# Patient Record
(demographics unavailable — no encounter records)

---

## 2025-03-17 NOTE — DISCUSSION/SUMMARY
[de-identified] : - reviewed the nature of this injury and prognosis with the patient and his wife in layman's terms - discussed indications for both operative and nonoperative treatment - reviewed conservative treatment options including the role for immobilization, anti-inflammatory medications and therapy - reviewed operative treatments including open reduction and internal fixation and the postoperative expectations - reviewed risks, benefits and alternatives to these - with all of this in mind the patient would like to proceed with closed treatment  - well-fitted Exos brace applied today  - brace care reviewed - activity modifications reviewed, no heavy lifting, pushing or pulling for now - NSAIDs as needed for pain - f/u in 2 weeks with new x-rays out of brace at that time  My cumulative time spent on this patient's visit included: Preparation for the visit, review of the medical records, review of pertinent diagnostic studies, examination and counseling of the patient on the above diagnosis, treatment plan and prognosis, orders of diagnostic tests, medications and/or appropriate procedures and documentation in the medical records of today's visit.  30 minutes were spent on this encounter

## 2025-03-17 NOTE — HISTORY OF PRESENT ILLNESS
[de-identified] : WC right wrist DOI 3/4/25  03/17/2025 MEGHA HOOD is a 56 year old male here today for: Location: right wrist  Complaint: The patient presents to the office today for evaluation of right wrist pain after a work-related injury.  He notes that he fell from a ladder while at work on 3/4.  He noted immediate pain and swelling and was later seen in the ER where he was placed in a splint with instructions to follow-up as an outpatient.  He later saw another hand surgeon who recommended surgical management.  He is here today for a second opinion.  Symptom onset: 3/4/25 Prior treatments: splint Hand Dominance:  left  Occupation: superintendent  PMH: HTN Allergies: NKDA

## 2025-03-17 NOTE — IMAGING
[de-identified] : Right wrist Mildly swollen with evolving ecchymosis No open wounds TTP at the distal radius Wrist motion not assessed due to fracture Able to make a loose composite fist +AIN/ PIN/ Ulnar n SILT throughout fingers wwp  3 views right wrist are available for review: Comminuted, intra-articular distal radius fracture with dorsal angulation

## 2025-03-31 NOTE — DISCUSSION/SUMMARY
[de-identified] : - reviewed the nature of this injury and prognosis with the patient and his wife in layman's terms - discussed indications for both operative and nonoperative treatment - reviewed conservative treatment options including the role for immobilization, anti-inflammatory medications and therapy - reviewed operative treatments including open reduction and internal fixation and the postoperative expectations - reviewed risks, benefits and alternatives to these - with all of this in mind the patient would like to proceed with closed treatment  - well-fitted Exos brace applied today  - brace care reviewed - activity modifications reviewed, no heavy lifting, pushing or pulling for now - NSAIDs as needed for pain - f/u in 2 weeks with new x-rays out of brace at that time  My cumulative time spent on this patient's visit included: Preparation for the visit, review of the medical records, review of pertinent diagnostic studies, examination and counseling of the patient on the above diagnosis, treatment plan and prognosis, orders of diagnostic tests, medications and/or appropriate procedures and documentation in the medical records of today's visit.  30 minutes were spent on this encounter [employer]

## 2025-03-31 NOTE — DISCUSSION/SUMMARY
[de-identified] : - reviewed the nature of this injury and prognosis with the patient and his wife in layman's terms - discussed indications for both operative and nonoperative treatment - reviewed conservative treatment options including the role for immobilization, anti-inflammatory medications and therapy - reviewed operative treatments including open reduction and internal fixation and the postoperative expectations - reviewed risks, benefits and alternatives to these - with all of this in mind the patient would like to proceed with closed treatment  - well-fitted Exos brace applied today  - brace care reviewed - activity modifications reviewed, no heavy lifting, pushing or pulling for now - NSAIDs as needed for pain - f/u in 2 weeks with new x-rays out of brace at that time  My cumulative time spent on this patient's visit included: Preparation for the visit, review of the medical records, review of pertinent diagnostic studies, examination and counseling of the patient on the above diagnosis, treatment plan and prognosis, orders of diagnostic tests, medications and/or appropriate procedures and documentation in the medical records of today's visit.  30 minutes were spent on this encounter

## 2025-03-31 NOTE — IMAGING
[de-identified] : Right wrist Mildly swollen with evolving ecchymosis No open wounds TTP at the distal radius Wrist motion not assessed due to fracture Able to make a loose composite fist +AIN/ PIN/ Ulnar n SILT throughout fingers wwp  3 views right wrist are available for review: Comminuted, intra-articular distal radius fracture with dorsal angulation

## 2025-03-31 NOTE — IMAGING
[de-identified] : Right wrist Mildly swollen with evolving ecchymosis No open wounds TTP at the distal radius Wrist motion not assessed due to fracture Able to make a loose composite fist +AIN/ PIN/ Ulnar n SILT throughout fingers wwp  3 views right wrist are available for review: Comminuted, intra-articular distal radius fracture with dorsal angulation

## 2025-03-31 NOTE — HISTORY OF PRESENT ILLNESS
[de-identified] : WC right wrist DOI 3/4/25  3/31/25: The patient returns today for repeat evaluation of his right wrist, reports he is doing okay, has been having soreness within the area.  03/17/2025 MEGHA HOOD is a 56 year old male here today for: Location: right wrist  Complaint: The patient presents to the office today for evaluation of right wrist pain after a work-related injury.  He notes that he fell from a ladder while at work on 3/4.  He noted immediate pain and swelling and was later seen in the ER where he was placed in a splint with instructions to follow-up as an outpatient.  He later saw another hand surgeon who recommended surgical management.  He is here today for a second opinion.  Symptom onset: 3/4/25 Prior treatments: splint Hand Dominance:  left  Occupation: superintendent  PMH: HTN Allergies: NKDA

## 2025-04-14 NOTE — WORK
[Fracture] : fracture [Was the competent medical cause of the injury] : was the competent medical cause of the injury [Are consistent with the injury] : are consistent with the injury [Consistent with my objective findings] : consistent with my objective findings [Does not reveal pre-existing condition(s) that may affect treatment/prognosis] : does not reveal pre-existing condition(s) that may affect treatment/prognosis [Patient] : patient [No Rx restrictions] : No Rx restrictions. [I provided the services listed above] :  I provided the services listed above.

## 2025-04-24 NOTE — HISTORY OF PRESENT ILLNESS
[de-identified] : WC right wrist DOI 3/4/25  4/14/25: The patient returns today for repeat evaluation of his right distal radius fracture treated nonoperatively.  He has been in a brace and remains somewhat stiff and painful but has been working on finger range of motion. No new injury, numbness and tingling.   03/17/2025 MEGHA HOOD is a 56 year old male here today for: Location: right wrist  Complaint: The patient presents to the office today for evaluation of right wrist pain after a work-related injury.  He notes that he fell from a ladder while at work on 3/4.  He noted immediate pain and swelling and was later seen in the ER where he was placed in a splint with instructions to follow-up as an outpatient.  He later saw another hand surgeon who recommended surgical management.  He is here today for a second opinion.  Symptom onset: 3/4/25 Prior treatments: splint Hand Dominance:  left  Occupation: superintendent  PMH: HTN Allergies: NKDA

## 2025-04-24 NOTE — IMAGING
[de-identified] : Right wrist No swelling, ecchymosis or wounds NTTP at the distal radius Stiff wrist ROM Able to make a loose composite fist +AIN/ PIN/ Ulnar n SILT throughout fingers wwp  3 views right wrist: Subacute, comminuted, intra-articular distal radius fracture with dorsal angulation, acceptable position, no interval displacement from prior

## 2025-04-24 NOTE — HISTORY OF PRESENT ILLNESS
[de-identified] : WC right wrist DOI 3/4/25  4/14/25: The patient returns today for repeat evaluation of his right distal radius fracture treated nonoperatively.  He has been in a brace and remains somewhat stiff and painful but has been working on finger range of motion. No new injury, numbness and tingling.   03/17/2025 MEGHA HOOD is a 56 year old male here today for: Location: right wrist  Complaint: The patient presents to the office today for evaluation of right wrist pain after a work-related injury.  He notes that he fell from a ladder while at work on 3/4.  He noted immediate pain and swelling and was later seen in the ER where he was placed in a splint with instructions to follow-up as an outpatient.  He later saw another hand surgeon who recommended surgical management.  He is here today for a second opinion.  Symptom onset: 3/4/25 Prior treatments: splint Hand Dominance:  left  Occupation: superintendent  PMH: HTN Allergies: NKDA

## 2025-04-24 NOTE — DISCUSSION/SUMMARY
[de-identified] : x-rays reviewed, interval healing Wean from brace  Wrist and finger range of motion exercises reviewed again OT Rx provided today NSAIDs as needed for pain Follow-up in 4 weeks with new x-rays

## 2025-04-24 NOTE — IMAGING
[de-identified] : Right wrist No swelling, ecchymosis or wounds NTTP at the distal radius Stiff wrist ROM Able to make a loose composite fist +AIN/ PIN/ Ulnar n SILT throughout fingers wwp  3 views right wrist: Subacute, comminuted, intra-articular distal radius fracture with dorsal angulation, acceptable position, no interval displacement from prior

## 2025-04-24 NOTE — DISCUSSION/SUMMARY
[de-identified] : x-rays reviewed, interval healing Wean from brace  Wrist and finger range of motion exercises reviewed again OT Rx provided today NSAIDs as needed for pain Follow-up in 4 weeks with new x-rays

## 2025-05-14 NOTE — DISCUSSION/SUMMARY
[de-identified] : x-rays reviewed, interval healing Continue range of motion exercises Continue OT  Okay to begin advancing activities as tolerated NSAIDs as needed for pain Follow-up in 4 weeks with new x-rays

## 2025-05-14 NOTE — HISTORY OF PRESENT ILLNESS
[de-identified] : 5/12/25: The patient returns today for repeat evaluation of his right distal radius fracture treated nonoperatively.  He has returned to many of his previous activities since we saw him last with minimal pain despite some mild stiffness.  He is working with physical therapy and is seeing slow improvements in his range of motion.  No new injuries or complaints today.  03/17/2025 MEGHA HOOD is a 56 year old male here today for: Location: right wrist  Complaint: The patient presents to the office today for evaluation of right wrist pain after a work-related injury.  He notes that he fell from a ladder while at work on 3/4.  He noted immediate pain and swelling and was later seen in the ER where he was placed in a splint with instructions to follow-up as an outpatient.  He later saw another hand surgeon who recommended surgical management.  He is here today for a second opinion.  Symptom onset: 3/4/25 Prior treatments: splint Hand Dominance:  left  Occupation: superintendent  PMH: HTN Allergies: NKDA

## 2025-05-14 NOTE — HISTORY OF PRESENT ILLNESS
[de-identified] : 5/12/25: The patient returns today for repeat evaluation of his right distal radius fracture treated nonoperatively.  He has returned to many of his previous activities since we saw him last with minimal pain despite some mild stiffness.  He is working with physical therapy and is seeing slow improvements in his range of motion.  No new injuries or complaints today.  03/17/2025 MEGHA HOOD is a 56 year old male here today for: Location: right wrist  Complaint: The patient presents to the office today for evaluation of right wrist pain after a work-related injury.  He notes that he fell from a ladder while at work on 3/4.  He noted immediate pain and swelling and was later seen in the ER where he was placed in a splint with instructions to follow-up as an outpatient.  He later saw another hand surgeon who recommended surgical management.  He is here today for a second opinion.  Symptom onset: 3/4/25 Prior treatments: splint Hand Dominance:  left  Occupation: superintendent  PMH: HTN Allergies: NKDA

## 2025-05-14 NOTE — IMAGING
[de-identified] : Right wrist No swelling, ecchymosis or wounds NTTP at the distal radius 45 deg extension, 30 deg flexion near full pronosupination Able to make a full composite fist +AIN/ PIN/ Ulnar n SILT throughout fingers wwp  3 views right wrist: Subacute, comminuted, intra-articular distal radius fracture with dorsal angulation, acceptable position, no interval displacement from prior

## 2025-05-14 NOTE — DISCUSSION/SUMMARY
[de-identified] : x-rays reviewed, interval healing Continue range of motion exercises Continue OT  Okay to begin advancing activities as tolerated NSAIDs as needed for pain Follow-up in 4 weeks with new x-rays

## 2025-05-14 NOTE — IMAGING
[de-identified] : Right wrist No swelling, ecchymosis or wounds NTTP at the distal radius 45 deg extension, 30 deg flexion near full pronosupination Able to make a full composite fist +AIN/ PIN/ Ulnar n SILT throughout fingers wwp  3 views right wrist: Subacute, comminuted, intra-articular distal radius fracture with dorsal angulation, acceptable position, no interval displacement from prior

## 2025-07-14 NOTE — DISCUSSION/SUMMARY
[de-identified] : x-rays reviewed, healed distal radius fracture Continue range of motion exercises Advance activities as tolerated NSAIDs as needed for pain Follow-up as needed

## 2025-07-14 NOTE — IMAGING
[de-identified] : Right wrist No swelling, ecchymosis or wounds NTTP at the distal radius 70 deg extension, 60 deg flexion near full pronosupination Able to make a full composite fist +AIN/ PIN/ Ulnar n SILT throughout fingers wwp  3 views right wrist: Well-healed, intra-articular distal radius fracture with mild dorsal angulation, acceptable position, no interval displacement from prior

## 2025-07-14 NOTE — IMAGING
[de-identified] : Right wrist No swelling, ecchymosis or wounds NTTP at the distal radius 70 deg extension, 60 deg flexion near full pronosupination Able to make a full composite fist +AIN/ PIN/ Ulnar n SILT throughout fingers wwp  3 views right wrist: Well-healed, intra-articular distal radius fracture with mild dorsal angulation, acceptable position, no interval displacement from prior

## 2025-07-14 NOTE — DISCUSSION/SUMMARY
[de-identified] : x-rays reviewed, healed distal radius fracture Continue range of motion exercises Advance activities as tolerated NSAIDs as needed for pain Follow-up as needed

## 2025-07-14 NOTE — HISTORY OF PRESENT ILLNESS
[de-identified] : 7/14/25: The patient returns today for repeat evaluation now 4 months s/p right distal radius fracture treated nonoperatively. He has returned to nearly all of his previous activities without limitation.  He has been working on his range of motion with significant gains. No new injuries or complaints today.  03/17/2025 MEGHA HOOD is a 56 year old male here today for: Location: right wrist  Complaint: The patient presents to the office today for evaluation of right wrist pain after a work-related injury.  He notes that he fell from a ladder while at work on 3/4.  He noted immediate pain and swelling and was later seen in the ER where he was placed in a splint with instructions to follow-up as an outpatient.  He later saw another hand surgeon who recommended surgical management.  He is here today for a second opinion.  Symptom onset: 3/4/25 Prior treatments: splint Hand Dominance:  left  Occupation: superintendent  PMH: HTN Allergies: NKDA

## 2025-07-14 NOTE — HISTORY OF PRESENT ILLNESS
[de-identified] : 7/14/25: The patient returns today for repeat evaluation now 4 months s/p right distal radius fracture treated nonoperatively. He has returned to nearly all of his previous activities without limitation.  He has been working on his range of motion with significant gains. No new injuries or complaints today.  03/17/2025 MEGHA HOOD is a 56 year old male here today for: Location: right wrist  Complaint: The patient presents to the office today for evaluation of right wrist pain after a work-related injury.  He notes that he fell from a ladder while at work on 3/4.  He noted immediate pain and swelling and was later seen in the ER where he was placed in a splint with instructions to follow-up as an outpatient.  He later saw another hand surgeon who recommended surgical management.  He is here today for a second opinion.  Symptom onset: 3/4/25 Prior treatments: splint Hand Dominance:  left  Occupation: superintendent  PMH: HTN Allergies: NKDA